# Patient Record
Sex: MALE | Race: WHITE | NOT HISPANIC OR LATINO
[De-identification: names, ages, dates, MRNs, and addresses within clinical notes are randomized per-mention and may not be internally consistent; named-entity substitution may affect disease eponyms.]

---

## 2023-04-11 ENCOUNTER — TRANSCRIBE ORDERS (OUTPATIENT)
Dept: OTHER | Age: 68
End: 2023-04-11

## 2023-04-11 DIAGNOSIS — R33.9 RETENTION OF URINE, UNSPECIFIED: Primary | ICD-10-CM

## 2023-04-13 ENCOUNTER — TELEPHONE (OUTPATIENT)
Dept: UROLOGY | Facility: CLINIC | Age: 68
End: 2023-04-13

## 2023-04-13 NOTE — TELEPHONE ENCOUNTER
[facility] Call Center    Phone Message    May a detailed message be left on voicemail: yes     Reason for Call: Appointment Intake    Referring Provider Name: Pool Kahn MD  Diagnosis and/or Symptoms: retention    Patient is being referred for retention.  Sending encounter per guidelines. Please review and follow-up with patient for scheduling.     Action Taken: Message routed to:  [facility] ([facility] Urology    Travel Screening: Not Applicable

## 2023-04-19 NOTE — TELEPHONE ENCOUNTER
Action    Action Taken Request sent to [facility] for recs- Patrick    Rerequested 23- Patrick    Requested 5/15/23- Patrick    LM for med recs     Spoke with [facility]_ not their patient/ called [facility] recs- they have no records of patient- [facility]- LM asking if they have any records- Clinic called back and patient isn't in their system           MEDICAL RECORDS REQUEST   [facility]  Urology Clinic  [address] [place] [id number]  PHONE: [phone]  Fax: [phone]        FUTURE VISIT INFORMATION                                                   Bibi Mitchell, : 1955 scheduled for future visit at [facility]    APPOINTMENT INFORMATION:    Date: 23    Provider:  David Almanzar    Reason for Visit/Diagnosis: Retention    REFERRAL INFORMATION:    Referring provider:  Pool Kahn     Referring providers clinic:  [facility]    RECORDS REQUESTED FOR VISIT                                                     NOTES  STATUS/DETAILS   OFFICE NOTE from referring provider  in process- [facility]   OFFICE NOTE from other specialist  yes- [facility]   DISCHARGE SUMMARY from hospital  in process   DISCHARGE REPORT from the ER  in process   OPERATIVE REPORT  in process   MEDICATION LIST  in process   LABS     URINALYSIS (UA)  in process   URINE CYTOLOGY  in process   IMAGING (IMAGES & REPORT)  in process     PRE-VISIT CHECKLIST      Record collection complete No- In process

## 2023-05-11 ENCOUNTER — PRE VISIT (OUTPATIENT)
Dept: UROLOGY | Facility: CLINIC | Age: 68
End: 2023-05-11

## 2023-05-11 NOTE — TELEPHONE ENCOUNTER
Reason for visit: Consult    Relevant information: Retention of the urine    Records/imaging/labs/orders: UA prior to visit    At Rooming: Nicki Mondragon  5/11/2023  1:35 PM

## 2023-05-15 NOTE — PROGRESS NOTES
Subjective     REQUESTING PROVIDER  Referred Self    REASON FOR VISIT  Lower urinary tract symptoms     HISTORY OF PRESENT ILLNESS  Mr. Mitchell is a pleasant 67 year old male with a past medical history significant for urethral stricture disease from traumatic fall in 1995 s/p repair in 2010, weak urinary stream , urinary frequency, erectile dysfunction, GERD, and asthma who presents today for further discussion of his lower urinary tract symptoms and urethral structure history.     Of note, Mr. Mitchell had a RUG on 6/10/22    Today:    Mild hesitancy, long time to urinate, weak stream, needs to strain at the end for complete emptying, frequency (1hr), occasional urgency, occasional urge incontinence    Denies intermittency, dysuria, gross hematuria, retention     History of one prostate infection around 3 years ago    Fluids:  o Water: 6 oz, 8 oz, 8 oz  o Coffee: 16-24 oz, 16-24 oz     Occasional right inguinal pain  o Mostly associated with movement  o No testicular pain    REVIEW OF SYSTEMS  Review of Systems   Constitutional: Negative for activity change and unexpected weight change.   HENT: Negative for ear pain and tinnitus.    Eyes: Positive for visual disturbance (Chronic issues with visual migranes ).   Respiratory: Positive for shortness of breath.    Cardiovascular: Positive for chest pain (Occasional).   Gastrointestinal: Positive for abdominal pain. Negative for constipation.   Genitourinary: Negative for hematuria.        Right inguinal pain   Musculoskeletal: Positive for back pain.   Neurological: Positive for dizziness (Frequent, not associated with anything) and light-headedness.   Hematological: Negative for adenopathy.   Psychiatric/Behavioral: Positive for sleep disturbance (Chronic issues, nothing new).     Social History:  Current smoker, >50year x 1ppd    Family History:  Fathers cousins with prostate cancer    No first degree urologic cancers    Objective     PHYSICAL EXAM  Physical  Exam    LABORATORY  No recent urine testing    PSA: 2.0 ng/dL    IMAGING  We do not have records of the retrograde urethrogram/cystogram done approximately 1 year ago    TESTING  PVR: 15 mL    Assessment & Plan   1. History of urethral stricture disease status postrepair in 2010 with evidence of recurrence on retrograde urethrogram in 2022  2. Mixed lower urinary tract symptoms   3. BPH  4. Overactive bladder    It was my pleasure to meet with Mr. Mitchell and his son Enmanuel in the office today in regards to his mixed lower urinary tract symptoms as well as urethral stricture disease.  I started our discussion by acknowledging that this is a complicated situation with many moving parts.  Given that there is documented return of his anterior urethral stricture disease, we discussed that it would be most prudent for us to get him plugged in with a reconstructive urologist in order to discuss urethral stricture repair.  We will look to get the records from his retrograde urethrogram/cystogram completed in 2022, and also have him meet first available with either Dr. Wu or Dr. Page to discuss actual stricture repair procedure.    In regards to his mixed lower urinary tract symptoms, we discussed that he has a mix of both obstructive and irritative voiding symptoms.  We discussed each of these different types of urinary symptoms in detail, as well as the possible etiologies of each of these including BPH, stricture disease, and severe bladder irritation due to lots of bladder irritant consumption and smoking.  I do believe it would be a good idea for us to get a updated urine specimen in order to rule out any microscopic blood in the urine that could be causing the symptoms, but also believe it would be prudent for us to trial him on the Flomax in order to help relieve any prostatic obstruction so after he gets his stricture repaired, his obstructive symptoms should be somewhat resolved.  Down the road, we may need to  consider an outlet procedure.  This is especially prudent given the fact that he struggles with dizziness and lightheadedness with unknown etiology at baseline, and we stressed that the Flomax medication can have this as a side effect and he should stop the medication immediately if it makes the symptoms worse.    We also discussed different lifestyle modifications for overactive bladder including avoidance of bladder irritants and proper fluid intake levels.  Overall, I believe he could be significantly helped by cutting back on his coffee consumption as well as stopping smoking.  We did briefly discuss medications for overactive bladder, but I would only like to start 1 medication at a time, so I will have him reach out to me in approximately 2 to 3 weeks at which point we can discuss starting an anticholinergic medication alongside the Flomax.    I recognize that this is a long drive for the patient as they are coming from [facility], so down the road he may want to pursue BPH and overactive bladder management with a more local urologist.  However, I do believe it is very warranted for him to continue with his stricture repair here given the excellent urologic care we can provide at this area.    Mr. Mitchell and his son expressed understanding and agreement to the above discussion and plan and all of his questions were answered to his satisfaction.      PLAN    Urine test to be completed at patient's convenience either locally or here    First available office visit with either Dr. Page or Dr. Wu for discussion of urethral stricture repair    We will look to get the images from the retrograde urethrogram/cystogram completed 1 year ago    Signed by:    David Almanzar PA-C    I spent a total of 65 minutes spent on the date of the encounter doing chart review, history and exam, documentation, and further activities as noted above.

## 2023-05-17 ENCOUNTER — OFFICE VISIT (OUTPATIENT)
Dept: UROLOGY | Facility: CLINIC | Age: 68
End: 2023-05-17
Payer: COMMERCIAL

## 2023-05-17 ENCOUNTER — PRE VISIT (OUTPATIENT)
Dept: UROLOGY | Facility: CLINIC | Age: 68
End: 2023-05-17

## 2023-05-17 VITALS
HEIGHT: 71 IN | WEIGHT: 165 LBS | SYSTOLIC BLOOD PRESSURE: 118 MMHG | HEART RATE: 72 BPM | BODY MASS INDEX: 23.1 KG/M2 | DIASTOLIC BLOOD PRESSURE: 81 MMHG

## 2023-05-17 DIAGNOSIS — N35.914 ANTERIOR URETHRAL STRICTURE: ICD-10-CM

## 2023-05-17 DIAGNOSIS — N32.81 OAB (OVERACTIVE BLADDER): ICD-10-CM

## 2023-05-17 DIAGNOSIS — N40.1 BENIGN PROSTATIC HYPERPLASIA WITH URINARY HESITANCY: ICD-10-CM

## 2023-05-17 DIAGNOSIS — R39.11 BENIGN PROSTATIC HYPERPLASIA WITH URINARY HESITANCY: ICD-10-CM

## 2023-05-17 PROCEDURE — 99205 OFFICE O/P NEW HI 60 MIN: CPT | Performed by: STUDENT IN AN ORGANIZED HEALTH CARE EDUCATION/TRAINING PROGRAM

## 2023-05-17 RX ORDER — TAMSULOSIN HYDROCHLORIDE 0.4 MG/1
0.4 CAPSULE ORAL DAILY
Qty: 30 CAPSULE | Refills: 11 | Status: SHIPPED | OUTPATIENT
Start: 2023-05-17

## 2023-05-17 ASSESSMENT — ENCOUNTER SYMPTOMS
CONSTIPATION: 0
SHORTNESS OF BREATH: 1
UNEXPECTED WEIGHT CHANGE: 0
LIGHT-HEADEDNESS: 1
ACTIVITY CHANGE: 0
ADENOPATHY: 0
HEMATURIA: 0
ABDOMINAL PAIN: 1
SLEEP DISTURBANCE: 1
DIZZINESS: 1
BACK PAIN: 1

## 2023-05-17 ASSESSMENT — PAIN SCALES - GENERAL: PAINLEVEL: NO PAIN (0)

## 2023-05-17 NOTE — PATIENT INSTRUCTIONS
First available office visit with Dr. Herber Page or Dr. Demetri Wu for discussion of stricture repair

## 2023-05-17 NOTE — LETTER
5/17/2023       RE: Bibi Mitchell  [address]  [facility] [address]     Dear Colleague,    Thank you for referring your patient, Bibi Mitchell, to the [facility] [place] at [facility]. Please see a copy of my visit note below.    Subjective     REQUESTING PROVIDER  Referred Self    REASON FOR VISIT  Lower urinary tract symptoms     HISTORY OF PRESENT ILLNESS  Mr. Mitchell is a pleasant 67 year old male with a past medical history significant for urethral stricture disease from traumatic fall in 1995 s/p repair in 2010, weak urinary stream , urinary frequency, erectile dysfunction, GERD, and asthma who presents today for further discussion of his lower urinary tract symptoms and urethral structure history.     Of note, Mr. Mitchell had a RUG on 6/10/22    Today:  Mild hesitancy, long time to urinate, weak stream, needs to strain at the end for complete emptying, frequency (1hr), occasional urgency, occasional urge incontinence  Denies intermittency, dysuria, gross hematuria, retention   History of one prostate infection around 3 years ago  Fluids:  Water: 6 oz, 8 oz, 8 oz  Coffee: 16-24 oz, 16-24 oz   Occasional right inguinal pain  Mostly associated with movement  No testicular pain    REVIEW OF SYSTEMS  Review of Systems   Constitutional: Negative for activity change and unexpected weight change.   HENT: Negative for ear pain and tinnitus.    Eyes: Positive for visual disturbance (Chronic issues with visual migranes ).   Respiratory: Positive for shortness of breath.    Cardiovascular: Positive for chest pain (Occasional).   Gastrointestinal: Positive for abdominal pain. Negative for constipation.   Genitourinary: Negative for hematuria.        Right inguinal pain   Musculoskeletal: Positive for back pain.   Neurological: Positive for dizziness (Frequent, not associated with anything) and light-headedness.   Hematological:  Negative for adenopathy.   Psychiatric/Behavioral: Positive for sleep disturbance (Chronic issues, nothing new).     Social History:  Current smoker, >50year x 1ppd    Family History:  Fathers cousins with prostate cancer    No first degree urologic cancers    Objective     PHYSICAL EXAM  Physical Exam    LABORATORY  No recent urine testing    PSA: 2.0 ng/dL    IMAGING  We do not have records of the retrograde urethrogram/cystogram done approximately 1 year ago    TESTING  PVR: 15 mL    Assessment & Plan   History of urethral stricture disease status postrepair in 2010 with evidence of recurrence on retrograde urethrogram in 2022  Mixed lower urinary tract symptoms   BPH  Overactive bladder    It was my pleasure to meet with Mr. Mitchell and his son Enmanuel in the office today in regards to his mixed lower urinary tract symptoms as well as urethral stricture disease.  I started our discussion by acknowledging that this is a complicated situation with many moving parts.  Given that there is documented return of his anterior urethral stricture disease, we discussed that it would be most prudent for us to get him plugged in with a reconstructive urologist in order to discuss urethral stricture repair.  We will look to get the records from his retrograde urethrogram/cystogram completed in 2022, and also have him meet first available with either Dr. Wu or Dr. Page to discuss actual stricture repair procedure.    In regards to his mixed lower urinary tract symptoms, we discussed that he has a mix of both obstructive and irritative voiding symptoms.  We discussed each of these different types of urinary symptoms in detail, as well as the possible etiologies of each of these including BPH, stricture disease, and severe bladder irritation due to lots of bladder irritant consumption and smoking.  I do believe it would be a good idea for us to get a updated urine specimen in order to rule out any microscopic blood in the  urine that could be causing the symptoms, but also believe it would be prudent for us to trial him on the Flomax in order to help relieve any prostatic obstruction so after he gets his stricture repaired, his obstructive symptoms should be somewhat resolved.  Down the road, we may need to consider an outlet procedure.  This is especially prudent given the fact that he struggles with dizziness and lightheadedness with unknown etiology at baseline, and we stressed that the Flomax medication can have this as a side effect and he should stop the medication immediately if it makes the symptoms worse.    We also discussed different lifestyle modifications for overactive bladder including avoidance of bladder irritants and proper fluid intake levels.  Overall, I believe he could be significantly helped by cutting back on his coffee consumption as well as stopping smoking.  We did briefly discuss medications for overactive bladder, but I would only like to start 1 medication at a time, so I will have him reach out to me in approximately 2 to 3 weeks at which point we can discuss starting an anticholinergic medication alongside the Flomax.    I recognize that this is a long drive for the patient as they are coming from [facility], so down the road he may want to pursue BPH and overactive bladder management with a more local urologist.  However, I do believe it is very warranted for him to continue with his stricture repair here given the excellent urologic care we can provide at this area.    Mr. Mitchell and his son expressed understanding and agreement to the above discussion and plan and all of his questions were answered to his satisfaction.      PLAN  Urine test to be completed at patient's convenience either locally or here  First available office visit with either Dr. Page or Dr. Wu for discussion of urethral stricture repair  We will look to get the images from the retrograde urethrogram/cystogram  completed 1 year ago    Signed by:    David Almanzar PA-C    I spent a total of 65 minutes spent on the date of the encounter doing chart review, history and exam, documentation, and further activities as noted above.

## 2023-05-17 NOTE — Clinical Note
Cooper Pollard,   This patient had a retrograde urethrogram/cystogram about one year ago. I can see the records in care everywhere but don't have the images. Can you look into getting those?  Thank you!  Anuel

## 2023-05-17 NOTE — NURSING NOTE
"Chief Complaint   Patient presents with     Consult     I've got problems peeing .        Blood pressure 118/81, pulse 72, height 1.803 m (5' 11\"), weight 74.8 kg (165 lb). Body mass index is 23.01 kg/m .    There is no problem list on file for this patient.      Allergies   Allergen Reactions     Penicillins        Current Outpatient Medications   Medication Sig Dispense Refill     tiotropium (SPIRIVA RESPIMAT) 2.5 MCG/ACT inhaler Inhale 2 puffs into the lungs daily              Elizabeth Mondragon  5/17/2023  12:12 PM     "

## 2023-05-26 ENCOUNTER — PRE VISIT (OUTPATIENT)
Dept: UROLOGY | Facility: CLINIC | Age: 68
End: 2023-05-26

## 2023-05-26 DIAGNOSIS — Q64.32 CONGENITAL STRICTURE OF URETHRA: Primary | ICD-10-CM

## 2023-05-26 NOTE — TELEPHONE ENCOUNTER
Reason for visit: urethral stricture consult     Relevant information: pt lives in [place]    Records/imaging/labs/orders: orders placed per provider for rug/vcug    Pt called: message sent to scheduling team    At Rooming: Questionnaire - JENNIFER Ahumada CMA  5/26/2023  10:02 AM

## 2023-05-30 ENCOUNTER — TELEPHONE (OUTPATIENT)
Dept: UROLOGY | Facility: CLINIC | Age: 68
End: 2023-05-30

## 2023-05-30 NOTE — TELEPHONE ENCOUNTER
"I called the number on file three times and each time the voicemail greeting says \"This is Ramón you know what to do\" Unable to reach the patient about the scan added on by Dr Aguirre. I have mailed out this info to the address on file.   "

## 2023-05-30 NOTE — TELEPHONE ENCOUNTER
"----- Message from Cassie Ahumada CMA sent at 5/30/2023 12:05 PM CDT -----  Regarding: RE: URGENT - imaging  I don't know what the protocol is for clinic coordinators. I do not leave messages if the voicemail doesn't match, but that's because I am relaying medical information and test results.      ----- Message -----  From: Mima Frazier  Sent: 5/30/2023   8:48 AM CDT  To: Beatriz Childs; Cassie Ahumada CMA  Subject: RE: URGENT - imaging                             I was going to call again, I can mail the letter. My question is should I call the son if I try the patients number and it still says \"Ramón\" who is neither the pt or the son. The son was here in person with the pt.   ----- Message -----  From: Cassie Ahumada CMA  Sent: 5/30/2023   8:15 AM CDT  To: Mima Frazier; Beatriz Childs  Subject: RE: URGENT - imaging                             Did you send a letter?      ----- Message -----  From: Mima Frazier  Sent: 5/26/2023   3:31 PM CDT  To: Cassie Ahumada CMA  Subject: RE: URGENT - imaging                             Patient is scheduled but when I called the number on file the  said this Ramón you know what to do. Not sure if I should call the son, I saw from the visit he was here with his father.   ----- Message -----  From: Cassie Ahumada CMA  Sent: 5/26/2023  10:02 AM CDT  To: Clinic Coordinators-[facility]  Subject: URGENT - imaging                                 Bibi needs a rug/vcug before his appointment with Dr. Aguirre - the same day, here.    Please call pt to schedule.    Thank you,  Cassie            "

## 2023-06-12 ENCOUNTER — ANCILLARY PROCEDURE (OUTPATIENT)
Dept: GENERAL RADIOLOGY | Facility: CLINIC | Age: 68
End: 2023-06-12
Attending: UROLOGY
Payer: COMMERCIAL

## 2023-06-12 ENCOUNTER — OFFICE VISIT (OUTPATIENT)
Dept: UROLOGY | Facility: CLINIC | Age: 68
End: 2023-06-12
Payer: COMMERCIAL

## 2023-06-12 VITALS
HEIGHT: 71 IN | BODY MASS INDEX: 23.8 KG/M2 | WEIGHT: 170 LBS | DIASTOLIC BLOOD PRESSURE: 75 MMHG | SYSTOLIC BLOOD PRESSURE: 111 MMHG | HEART RATE: 63 BPM

## 2023-06-12 DIAGNOSIS — N35.914 ANTERIOR URETHRAL STRICTURE: Primary | ICD-10-CM

## 2023-06-12 DIAGNOSIS — Q64.32 CONGENITAL STRICTURE OF URETHRA: ICD-10-CM

## 2023-06-12 PROCEDURE — 51600 INJECTION FOR BLADDER X-RAY: CPT | Performed by: RADIOLOGY

## 2023-06-12 PROCEDURE — 74455 X-RAY URETHRA/BLADDER: CPT | Performed by: RADIOLOGY

## 2023-06-12 PROCEDURE — 99214 OFFICE O/P EST MOD 30 MIN: CPT | Performed by: UROLOGY

## 2023-06-12 RX ORDER — IOPAMIDOL 510 MG/ML
100 INJECTION, SOLUTION INTRAVASCULAR ONCE
Status: COMPLETED | OUTPATIENT
Start: 2023-06-12 | End: 2023-06-12

## 2023-06-12 RX ORDER — TRIAMCINOLONE ACETONIDE 0.25 MG/G
CREAM TOPICAL
COMMUNITY
Start: 2023-03-13

## 2023-06-12 RX ORDER — LACTOBACILLUS RHAMNOSUS GG 10B CELL
CAPSULE ORAL
COMMUNITY

## 2023-06-12 RX ORDER — CETIRIZINE HYDROCHLORIDE 10 MG/1
TABLET ORAL EVERY 24 HOURS
COMMUNITY

## 2023-06-12 RX ORDER — FAMOTIDINE 20 MG/1
TABLET, FILM COATED ORAL EVERY 24 HOURS
COMMUNITY

## 2023-06-12 RX ORDER — FLUOCINONIDE 0.5 MG/G
CREAM TOPICAL
COMMUNITY
Start: 2023-05-25

## 2023-06-12 RX ORDER — VITAMIN B COMPLEX
3000 TABLET ORAL
COMMUNITY

## 2023-06-12 RX ORDER — LIDOCAINE HYDROCHLORIDE 20 MG/ML
5 SOLUTION OROPHARYNGEAL ONCE
Status: DISCONTINUED | OUTPATIENT
Start: 2023-06-12 | End: 2023-06-12

## 2023-06-12 RX ORDER — LANOLIN ALCOHOL/MO/W.PET/CERES
9 CREAM (GRAM) TOPICAL
COMMUNITY

## 2023-06-12 RX ORDER — ACETAMINOPHEN 325 MG/1
650 TABLET ORAL
COMMUNITY

## 2023-06-12 RX ORDER — ALBUTEROL SULFATE 90 UG/1
2 AEROSOL, METERED RESPIRATORY (INHALATION)
COMMUNITY

## 2023-06-12 RX ORDER — PANTOPRAZOLE SODIUM 40 MG/1
TABLET, DELAYED RELEASE ORAL
COMMUNITY

## 2023-06-12 RX ORDER — LIDOCAINE HYDROCHLORIDE 20 MG/ML
JELLY TOPICAL ONCE
Status: COMPLETED | OUTPATIENT
Start: 2023-06-12 | End: 2023-06-12

## 2023-06-12 RX ORDER — MELOXICAM 15 MG/1
7.5 TABLET ORAL
COMMUNITY

## 2023-06-12 RX ORDER — VITAMIN B COMPLEX
1 CAPSULE ORAL DAILY
COMMUNITY

## 2023-06-12 RX ADMIN — IOPAMIDOL 575 ML: 510 INJECTION, SOLUTION INTRAVASCULAR at 09:11

## 2023-06-12 RX ADMIN — LIDOCAINE HYDROCHLORIDE 10 ML: 20 JELLY TOPICAL at 09:07

## 2023-06-12 ASSESSMENT — PAIN SCALES - GENERAL: PAINLEVEL: MILD PAIN (2)

## 2023-06-12 NOTE — PROGRESS NOTES
"HPI:  Bibi Mitchell is a 67 year old male being seen for urethroplasty.    Long history weak stream, urinary frequency. Some dysuria and pelvic pain in bulbar urethra area.     1995 injury (fall from roof) onto a concrete slab fx's, bladder injury. No ex lap. Had a catheter while in the hospital. Left femur ORIF with violette. Left elbow fracture.   History urethral stricture.  2010: Urologist in [place] cystoscopic scar treatment (DVIU or dilation) with some improvement in stream, although still thin stream but less frequency    H/o prostatitis that likely is due to stricture    PMH/PSH: fall and sequelae as described above. Laminectomy and discectomy in cervical region.   1985 2 ribs removed. Lap hong Dec 2022.     Reviewed previous notes from Dr. Rodriguez from urology service at [place], [place].    Exam:  /75   Pulse 63   Ht 1.803 m (5' 11\")   Wt 77.1 kg (170 lb)   BMI 23.71 kg/m    Abdomen: Degree of obesity is none. Abdomen is soft and nontender. No organomegaly. Surgical scars include left subcostal and lap port sites. No suprapubic scars..  : testicles normal without atrophy or masses and penis normal without urethral discharge    Review of Imaging:  The following imaging exams were independently viewed and interpreted by me and discussed with patient:  RUG/VCUG: Abnormal: today. Penile urethra looks normal. There is a proximal bulbar stricture of indeterminate length.     Review of Labs:  The following labs were reviewed by me and discussed with the patient:  No results found for this or any previous visit (from the past 720 hour(s)).      Assessment & Plan   1. Bulbar urethral stricture of indeterminate length. R/B/A of EPA vs. Buccal graft urethroplasty discussed. He understands the risks to include but not be limited to bleeding, infection, penile or scrotal pain/numbness, change in erectile or ejaculatory function, need for additional procedures and recurrence of primary disease. He wishes to " proceed.  2. Explained we will have to figure out intra-op whether we use a buccal graft or not.   3. Will need to quit smoking before surgery -- vishnu to call him monthly    Herber Aguirre MD  [facility] [place]      ==========================      Additional Coding Information:    Problems:  4 -- one or more chronic illnesses with exacerbation or side effects    Data Reviewed  Review of external notes as documented above     Independent interpretation of a test performed by another physician/other qualified health care professional (not separately reported) - urethrogram    Level of risk:  5 -- major surgery with patient or procedure risks

## 2023-06-12 NOTE — LETTER
"6/12/2023       RE: Bibi Mitchell  [address]  [facility] [address]     Dear Colleague,    Thank you for referring your patient, Bibi Mitchell, to the [facility] [place] [facility]. Please see a copy of my visit note below.    HPI:  Bibi Mitchell is a 67 year old male being seen for urethroplasty.    Long history weak stream, urinary frequency. Some dysuria and pelvic pain in bulbar urethra area.     1995 injury (fall from roof) onto a concrete slab fx's, bladder injury. No ex lap. Had a catheter while in the hospital. Left femur ORIF with violette. Left elbow fracture.   History urethral stricture.  2010: Urologist in [place] cystoscopic scar treatment (DVIU or dilation) with some improvement in stream, although still thin stream but less frequency    H/o prostatitis that likely is due to stricture    PMH/PSH: fall and sequelae as described above. Laminectomy and discectomy in cervical region.   1985 2 ribs removed. Lap hong Dec 2022.     Reviewed previous notes from Dr. Rodriguez from urology service at [place], [place].    Exam:  /75   Pulse 63   Ht 1.803 m (5' 11\")   Wt 77.1 kg (170 lb)   BMI 23.71 kg/m    Abdomen: Degree of obesity is none. Abdomen is soft and nontender. No organomegaly. Surgical scars include left subcostal and lap port sites. No suprapubic scars..  : testicles normal without atrophy or masses and penis normal without urethral discharge    Review of Imaging:  The following imaging exams were independently viewed and interpreted by me and discussed with patient:  RUG/VCUG: Abnormal: today. Penile urethra looks normal. There is a proximal bulbar stricture of indeterminate length.     Review of Labs:  The following labs were reviewed by me and discussed with the patient:  No results found for this or any previous visit (from the past 720 hour(s)).      Assessment & Plan   Bulbar urethral stricture of " indeterminate length. R/B/A of EPA vs. Buccal graft urethroplasty discussed. He understands the risks to include but not be limited to bleeding, infection, penile or scrotal pain/numbness, change in erectile or ejaculatory function, need for additional procedures and recurrence of primary disease. He wishes to proceed.  Explained we will have to figure out intra-op whether we use a buccal graft or not.   Will need to quit smoking before surgery -- vishnu to call him monthly    Herber Aguirre MD  [facility] [place]      ==========================      Additional Coding Information:    Problems:  4 -- one or more chronic illnesses with exacerbation or side effects    Data Reviewed  Review of external notes as documented above     Independent interpretation of a test performed by another physician/other qualified health care professional (not separately reported) - urethrogram    Level of risk:  5 -- major surgery with patient or procedure risks

## 2023-06-12 NOTE — NURSING NOTE
"Chief Complaint   Patient presents with     Follow Up     Pt states that he is wondering if he is a candidate for a stricture repair.        Blood pressure 111/75, pulse 63, height 1.803 m (5' 11\"), weight 77.1 kg (170 lb). Body mass index is 23.71 kg/m .    Patient Active Problem List   Diagnosis     Anterior urethral stricture     Benign prostatic hyperplasia with urinary hesitancy     OAB (overactive bladder)       Allergies   Allergen Reactions     Varenicline Hives, Other (See Comments) and Rash     Other reaction(s): HIVES (Severe)     Penicillins        Current Outpatient Medications   Medication Sig Dispense Refill     tamsulosin (FLOMAX) 0.4 MG capsule Take 1 capsule (0.4 mg) by mouth daily 30 capsule 11     tiotropium (SPIRIVA RESPIMAT) 2.5 MCG/ACT inhaler Inhale 2 puffs into the lungs daily         Social History     Tobacco Use     Smoking status: Every Day     Types: Cigarettes     Smokeless tobacco: Never       Cassie Ahumada CMA  6/12/2023  10:42 AM     "

## 2023-08-14 ENCOUNTER — PATIENT OUTREACH (OUTPATIENT)
Dept: UROLOGY | Facility: CLINIC | Age: 68
End: 2023-08-14

## 2023-08-14 NOTE — TELEPHONE ENCOUNTER
Writer reached out to pt to inquire about his smoking cessation.     No answer, writer left generic VM with call back name and number.     Will continue to follow as needed.

## 2023-08-18 ENCOUNTER — PATIENT OUTREACH (OUTPATIENT)
Dept: UROLOGY | Facility: CLINIC | Age: 68
End: 2023-08-18

## 2023-08-18 NOTE — TELEPHONE ENCOUNTER
Writer reached out to pt to inquire about smoking cessation.     No answer, writer left generic VM with call back name and number.     Will continue to follow as needed.

## 2023-09-01 ENCOUNTER — TELEPHONE (OUTPATIENT)
Dept: UROLOGY | Facility: CLINIC | Age: 68
End: 2023-09-01

## 2023-09-01 NOTE — TELEPHONE ENCOUNTER
----- Message from Leena Edgar RN sent at 9/1/2023  2:58 PM CDT -----  Regarding: FW: Smoking status/update    ----- Message -----  From: Herber Aguirre MD  Sent: 9/1/2023  12:58 PM CDT  To: Leena Edgar RN; Cassie Ahumada CMA  Subject: FW: Smoking status/update                        See below, Could one of you check with him again to see if still smoking?  ----- Message -----  From: Herber Aguirre MD  Sent: 9/1/2023  12:00 AM CDT  To: Herber Aguirre MD; #  Subject: FW: Smoking status/update                        Check with patient again in september  ----- Message -----  From: Brandi Cee RN  Sent: 7/12/2023   9:39 AM CDT  To: Herber Aguirre MD  Subject: Smoking status/update                            Pt who wants a urethroplasty but needs to quit smoking.     I spoke with pt today.  He has not quit smoking yet.  He has however [facility] to [facility].  I let him know we would check in with him in another month.    Brandi

## 2023-09-01 NOTE — TELEPHONE ENCOUNTER
Writer reached out to PT regarding smoking cessation which is needed for his planned urethroplasty with Dr. Aguirre.  No answer, general vm left for pt.

## 2023-09-05 ENCOUNTER — TELEPHONE (OUTPATIENT)
Dept: UROLOGY | Facility: CLINIC | Age: 68
End: 2023-09-05

## 2023-09-05 NOTE — TELEPHONE ENCOUNTER
----- Message from Olga Lidia Jordan RN sent at 9/1/2023  3:06 PM CDT -----  Regarding: FW: Smoking status/update    ----- Message -----  From: Leena Edgar RN  Sent: 9/1/2023   2:58 PM CDT  To: Olga Lidia Jordan RN  Subject: FW: Smoking status/update                          ----- Message -----  From: Herber Aguirre MD  Sent: 9/1/2023  12:58 PM CDT  To: Leena Edgar RN; Cassie Ahumada CMA  Subject: FW: Smoking status/update                        See below, Could one of you check with him again to see if still smoking?  ----- Message -----  From: Herber Aguirre MD  Sent: 9/1/2023  12:00 AM CDT  To: Herber Aguirre MD; #  Subject: FW: Smoking status/update                        Check with patient again in september  ----- Message -----  From: Brandi Cee RN  Sent: 7/12/2023   9:39 AM CDT  To: Herber Aguirre MD  Subject: Smoking status/update                            Pt who wants a urethroplasty but needs to quit smoking.     I spoke with pt today.  He has not quit smoking yet.  He has however been working [facility] to help with his quit plan.  I let him know we would check in with him in another month.    Brandi

## 2023-09-14 ENCOUNTER — PATIENT OUTREACH (OUTPATIENT)
Dept: UROLOGY | Facility: CLINIC | Age: 68
End: 2023-09-14

## 2023-10-02 NOTE — TELEPHONE ENCOUNTER
Pt reached out to writer in response to his smoking cessation.     Pt notes that he has quite smoking as of 10 days ago. Pt notes he will be having a cardiac procedure on 9/25.     Writer will make sure to communicate with Dr Crook team regarding being able to pursue urology procedure.     Will continue to follow as needed.

## (undated) RX ORDER — LIDOCAINE HYDROCHLORIDE 20 MG/ML
JELLY TOPICAL
Status: DISPENSED
Start: 2023-06-12